# Patient Record
Sex: MALE | Race: WHITE
[De-identification: names, ages, dates, MRNs, and addresses within clinical notes are randomized per-mention and may not be internally consistent; named-entity substitution may affect disease eponyms.]

---

## 2018-02-06 ENCOUNTER — HOSPITAL ENCOUNTER (OUTPATIENT)
Dept: HOSPITAL 17 - ESDC | Age: 83
Discharge: HOME | End: 2018-02-06
Attending: OPHTHALMOLOGY
Payer: MEDICARE

## 2018-02-06 DIAGNOSIS — H33.022: Primary | ICD-10-CM

## 2018-02-06 PROCEDURE — 67108 REPAIR DETACHED RETINA: CPT

## 2018-02-06 PROCEDURE — 00145 ANES PX EYE VITREORTNL SURG: CPT

## 2018-02-12 NOTE — TN
cc:

TRUMAN TURNER MD

****

 

 

DATE OF SURGERY

02/06/2018

 

DATE OF BIRTH

June 12, 1935

 

PREOPERATIVE DIAGNOSIS

Large retinal detachment with multiple retinal tears through affecting his

macula left eye.

 

POSTOPERATIVE DIAGNOSIS

Large retinal detachment with multiple retinal tears through affecting his

macula left eye.

 

PROCEDURE

Pars plana vitrectomy, retinal detachment repair, endolaser, insertion of 18%

SF6 gas left eye.

 

COMPLICATIONS

None.

 

BLOOD LOSS

Less than 1 cc.

 

ANESTHESIA

General, Dr. Denise

 

INDICATION FOR PROCEDURE

This delightful patient presented with severe vision loss in his left eye and

was found to have a large retinal detachment through his macula with multiple

retinal tears.  The patient elected for surgical correction understanding the

risks, benefits and alternatives.

 

PROCEDURE NOTE

After informed consent was obtained, the patient was brought to the operating

room.  General anesthesia was established.  The left eye was prepped and draped

in sterile fashion with Betadine in the conjunctival fornix.  A three port pars

plana vitrectomy was established with a self-retaining infusion cannula.  Core

vitrectomy was carried out and peripheral vitreous was shaved carefully

releasing peripheral vitreous traction with vitrectomy and scleral depression.

The subretinal fluid was removed and retina reattached.  PFO was instilled and

brought over the levels of the horseshoe tears.  The peripheral retina was

treated and _____________ disease with endolaser taking care to surrounding

each retinal tear.  Air-fluid exchange was carried out and retina remained

nicely attached.  Scleral depression examination revealed no untreated retinal

holes, tears or detachments.  18% SF6 gas was instilled.  The trocars were

removed and sclerotomies closed.  A subconjunctival injection of Ancef and

dexamethasone were given.  The eye was patched with Tobramycin ointment.

 

The patient was brought to the recovery room in stable condition and will

continue to follow up with [facility] for his postoperative care.

 

 

                              _________________________________

                              Truman Turner MD

 

 

 

KW/SSB

D:  2/9/2018/7:01 PM

T:  2/12/2018/6:02 AM

Visit #:  [id number]

Job #:  [id number]